# Patient Record
Sex: MALE | Race: WHITE | URBAN - METROPOLITAN AREA
[De-identification: names, ages, dates, MRNs, and addresses within clinical notes are randomized per-mention and may not be internally consistent; named-entity substitution may affect disease eponyms.]

---

## 2020-05-26 ENCOUNTER — OFFICE VISIT (OUTPATIENT)
Dept: PRIMARY CARE CLINIC | Age: 75
End: 2020-05-26

## 2020-05-26 VITALS
WEIGHT: 190.8 LBS | SYSTOLIC BLOOD PRESSURE: 124 MMHG | HEIGHT: 68 IN | HEART RATE: 76 BPM | OXYGEN SATURATION: 98 % | BODY MASS INDEX: 28.92 KG/M2 | DIASTOLIC BLOOD PRESSURE: 62 MMHG | TEMPERATURE: 99.4 F

## 2020-05-26 PROCEDURE — 99214 OFFICE O/P EST MOD 30 MIN: CPT | Performed by: INTERNAL MEDICINE

## 2020-05-26 NOTE — PATIENT INSTRUCTIONS
Advance Care Planning  People with COVID-19 may have no symptoms, mild symptoms, such as fever, cough, and shortness of breath or they may have more severe illness, developing severe and fatal pneumonia. As a result, Advance Care Planning with attention to naming a health care decision maker (someone you trust to make healthcare decisions for you if you could not speak for yourself) and sharing other health care preferences is important BEFORE a possible health crisis. Please contact your Primary Care Provider to discuss Advance Care Planning. Preventing the Spread of Coronavirus Disease 2019 in Homes and Residential Communities  For the most recent information go to [email or web address]    Prevention steps for People with confirmed or suspected COVID-19 (including persons under investigation) who do not need to be hospitalized  and   People with confirmed COVID-19 who were hospitalized and determined to be medically stable to go home    Your healthcare provider and public health staff will evaluate whether you can be cared for at home. If it is determined that you do not need to be hospitalized and can be isolated at home, you will be monitored by staff from your local or state health department. You should follow the prevention steps below until a healthcare provider or local or state health department says you can return to your normal activities. Stay home except to get medical care  People who are mildly ill with COVID-19 are able to isolate at home during their illness. You should restrict activities outside your home, except for getting medical care. Do not go to work, school, or public areas. Avoid using public transportation, ride-sharing, or taxis. Separate yourself from other people and animals in your home  People: As much as possible, you should stay in a specific room and away from other people in your home.  Also, you should use a separate before eating or preparing food. If soap and water are not readily available, use an alcohol-based hand  with at least 60% alcohol, covering all surfaces of your hands and rubbing them together until they feel dry. Soap and water are the best option if hands are visibly dirty. Avoid touching your eyes, nose, and mouth with unwashed hands. Avoid sharing personal household items  You should not share dishes, drinking glasses, cups, eating utensils, towels, or bedding with other people or pets in your home. After using these items, they should be washed thoroughly with soap and water. Clean all high-touch surfaces everyday  High touch surfaces include counters, tabletops, doorknobs, bathroom fixtures, toilets, phones, keyboards, tablets, and bedside tables. Also, clean any surfaces that may have blood, stool, or body fluids on them. Use a household cleaning spray or wipe, according to the label instructions. Labels contain instructions for safe and effective use of the cleaning product including precautions you should take when applying the product, such as wearing gloves and making sure you have good ventilation during use of the product. Monitor your symptoms  Seek prompt medical attention if your illness is worsening (e.g., difficulty breathing). Before seeking care, call your healthcare provider and tell them that you have, or are being evaluated for, COVID-19. Put on a facemask before you enter the facility. These steps will help the healthcare providers office to keep other people in the office or waiting room from getting infected or exposed. Ask your healthcare provider to call the local or state health department. Persons who are placed under active monitoring or facilitated self-monitoring should follow instructions provided by their local health department or occupational health professionals, as appropriate. When working with your local health department check their available hours.   If you

## 2020-05-26 NOTE — PROGRESS NOTES
[facility]:    [facility] [id number]      2020    Patient ID:  Zander Harrison     : 1945    HPI/SYMPTOMS:    Patient is a 66 y.o. male  presenting with 2 days of the following symptoms:  fever: 100.5-101.9, myalgias/arthralgias and nausea with vomiting x < 24 hours. Symptoms have been resolved with time. He denies headache, sore throat, shortness of breath, wheezing/chest tightness and cough. Relevant PMH: No pertinent PMH. Smoking history:  He  has no history on file for tobacco. He has had no known ill contacts. Treatment to date: none. re. Diarrhea that resolved < 24 hours. No bloody stool; had scant red blood on TP consistent with known hemorrhoid. Had small formed stool today; no hematuria. Abdominal pain that feels like a \"gas bubble\" that comes and goes with bloating. Fever (tmax 101/102) that has resolved, although he has low grade fever today. Runny nose since returning from [place] 1 month ago. Has had no contact with anyone since [facility]. Symptoms have essentially resolve except low grade temp 99 and mild fatigue. Denies any sore throat, chest pain, pressure, tightness, SOB/PEÑALOZA, LE edema       The patient's SaO2 assessment for the [facility] was as follows.     Initial SaO2 before activity was 99%  After walking 45-50 feet, the patient's SaO2 was 99%   The timed walking test of 20 seconds revealed an SaO2 of 98%  The patient's perceived dyspnea with activity is:0        Vitals:    20 1346   Pulse: 76   Temp: 99.4 °F (37.4 °C)   SpO2: 98%   Weight: 190 lb 12.8 oz (86.5 kg)   Height: 5' 8\" (1.727 m)     No results found for: NA, K, CL, CO2, BUN, CREATININE, GLUCOSE, CALCIUM   No results found for: CHOL  No results found for: TRIG  No results found for: HDL  No results found for: LDLCHOLESTEROL, LDLCALC  No results found for: LABVLDL, VLDL  No results found for: CHOLHDLRATIO      Allergies not on file    No outpatient
diarrhea. Diagnoses and all orders for this visit:    Suspected COVID-19 virus infection  -     COVID-19 Ambulatory; Future  -     COVID-19 Ambulatory    Hypoxia    Nausea and vomiting, intractability of vomiting not specified, unspecified vomiting type  Will have pt see [facility] provider for addition pulse ox testing  And further evaluation. Educated patient on precautions at home and provided written CDC recommendations for COVID 19. Treat symptoms with OTC medications. May use tylenol for fever and pain. Avoid all medications containing NSAIDs. Patient instructed to call PCP if symptoms worsen or fail to improve, and to go to the ED if develops red flags (these symptoms were reviewed with patient). Patient informed can also return to this site for reassessment of related symptoms. Patient expressed understanding. Discharge home with written instructions for:  [] Flu management including medication treatment if qualifies  [] CDC guildelines for self-quarantine in an asymptomatic patient with COVID-19 exposure   (2 weeks if no symptoms. If symptoms develop then patient follows isolation guidelines below.)  [x] CDC guidelines for isolation in symptomatic patient with assumed COVID-19;        TO END ISOLATION: (Patient understands these guidelines are subject to change)  1. No fever for at least 72 hours without medications AND  2. Symptoms have resolved AND  3. At least 10 days from initial symptom onset  Patient understands that a hands-on exam could not be completed during this high risk assessment due to the COVID-19 pandemic. This note will be routed to the patient's PCP to inform of this limited assessment. An  electronic signature was used to authenticate this note.     --Theodore Patel MA on 5/26/2020 at 1:48 PM

## 2020-05-29 LAB
SARS-COV-2: NOT DETECTED
SOURCE: NORMAL

## 2020-05-29 NOTE — RESULT ENCOUNTER NOTE
Please contact patient with their testing results: Your test for COVID-19, also known as novel coronavirus, came back negative. No virus was detected from the sample collected. Until your symptoms are fully resolved, you may still be contagious. We recommend that you remain isolated for 7 days minimum or 72 hours after your symptoms have completely resolved, whichever is longer. Continually monitor symptoms. Contact a medical provider if symptoms are worsening. If you have any additional questions, contact your PCP.     For additional information, please visit the Centers for Disease Control and Prevention   [email or web address]